# Patient Record
Sex: FEMALE | Race: BLACK OR AFRICAN AMERICAN | NOT HISPANIC OR LATINO | URBAN - METROPOLITAN AREA
[De-identification: names, ages, dates, MRNs, and addresses within clinical notes are randomized per-mention and may not be internally consistent; named-entity substitution may affect disease eponyms.]

---

## 2023-10-04 ENCOUNTER — HOSPITAL ENCOUNTER (EMERGENCY)
Facility: HOSPITAL | Age: 48
Discharge: LEFT BEFORE DISCHARGE | End: 2023-10-04
Payer: COMMERCIAL

## 2023-10-04 LAB
ALBUMIN SERPL BCP-MCNC: 4.4 G/DL (ref 3.4–5)
ALP SERPL-CCNC: 45 U/L (ref 33–110)
ALT SERPL W P-5'-P-CCNC: 18 U/L (ref 7–45)
ANION GAP SERPL CALC-SCNC: 10 MMOL/L (ref 10–20)
AST SERPL W P-5'-P-CCNC: 20 U/L (ref 9–39)
B-HCG SERPL-ACNC: <2 MIU/ML
BASOPHILS # BLD AUTO: 0.05 X10*3/UL (ref 0–0.1)
BASOPHILS NFR BLD AUTO: 0.7 %
BILIRUB SERPL-MCNC: 0.4 MG/DL (ref 0–1.2)
BUN SERPL-MCNC: 13 MG/DL (ref 6–23)
CALCIUM SERPL-MCNC: 9.6 MG/DL (ref 8.6–10.3)
CHLORIDE SERPL-SCNC: 101 MMOL/L (ref 98–107)
CO2 SERPL-SCNC: 28 MMOL/L (ref 21–32)
CREAT SERPL-MCNC: 1.07 MG/DL (ref 0.5–1.05)
EOSINOPHIL # BLD AUTO: 0.14 X10*3/UL (ref 0–0.7)
EOSINOPHIL NFR BLD AUTO: 2 %
ERYTHROCYTE [DISTWIDTH] IN BLOOD BY AUTOMATED COUNT: 13.2 % (ref 11.5–14.5)
GFR SERPL CREATININE-BSD FRML MDRD: 63 ML/MIN/1.73M*2
GLUCOSE SERPL-MCNC: 79 MG/DL (ref 74–99)
HCT VFR BLD AUTO: 36.8 % (ref 36–46)
HGB BLD-MCNC: 11.9 G/DL (ref 12–16)
IMM GRANULOCYTES # BLD AUTO: 0.02 X10*3/UL (ref 0–0.7)
IMM GRANULOCYTES NFR BLD AUTO: 0.3 % (ref 0–0.9)
LYMPHOCYTES # BLD AUTO: 1.58 X10*3/UL (ref 1.2–4.8)
LYMPHOCYTES NFR BLD AUTO: 22.3 %
MCH RBC QN AUTO: 26.7 PG (ref 26–34)
MCHC RBC AUTO-ENTMCNC: 32.3 G/DL (ref 32–36)
MCV RBC AUTO: 83 FL (ref 80–100)
MONOCYTES # BLD AUTO: 0.34 X10*3/UL (ref 0.1–1)
MONOCYTES NFR BLD AUTO: 4.8 %
NEUTROPHILS # BLD AUTO: 4.97 X10*3/UL (ref 1.2–7.7)
NEUTROPHILS NFR BLD AUTO: 69.9 %
NRBC BLD-RTO: 0 /100 WBCS (ref 0–0)
PLATELET # BLD AUTO: 270 X10*3/UL (ref 150–450)
PMV BLD AUTO: 10 FL (ref 7.5–11.5)
POTASSIUM SERPL-SCNC: 4.3 MMOL/L (ref 3.5–5.3)
PROT SERPL-MCNC: 7.2 G/DL (ref 6.4–8.2)
RBC # BLD AUTO: 4.46 X10*6/UL (ref 4–5.2)
SODIUM SERPL-SCNC: 135 MMOL/L (ref 136–145)
WBC # BLD AUTO: 7.1 X10*3/UL (ref 4.4–11.3)

## 2023-10-04 PROCEDURE — 80053 COMPREHEN METABOLIC PANEL: CPT | Performed by: PHYSICIAN ASSISTANT

## 2023-10-04 PROCEDURE — 84702 CHORIONIC GONADOTROPIN TEST: CPT | Performed by: PHYSICIAN ASSISTANT

## 2023-10-04 PROCEDURE — 36415 COLL VENOUS BLD VENIPUNCTURE: CPT | Performed by: PHYSICIAN ASSISTANT

## 2023-10-04 PROCEDURE — 85025 COMPLETE CBC W/AUTO DIFF WBC: CPT | Performed by: PHYSICIAN ASSISTANT

## 2023-10-04 PROCEDURE — 99284 EMERGENCY DEPT VISIT MOD MDM: CPT

## 2023-10-04 PROCEDURE — 99283 EMERGENCY DEPT VISIT LOW MDM: CPT

## 2023-10-04 ASSESSMENT — COLUMBIA-SUICIDE SEVERITY RATING SCALE - C-SSRS
2. HAVE YOU ACTUALLY HAD ANY THOUGHTS OF KILLING YOURSELF?: NO
1. IN THE PAST MONTH, HAVE YOU WISHED YOU WERE DEAD OR WISHED YOU COULD GO TO SLEEP AND NOT WAKE UP?: NO
6. HAVE YOU EVER DONE ANYTHING, STARTED TO DO ANYTHING, OR PREPARED TO DO ANYTHING TO END YOUR LIFE?: NO

## 2023-10-04 NOTE — ED NOTES
Pt does not want to wait any longer. IV removed and Pt departed.     Toño Pfeiffer RN  10/04/23 [id number]

## 2023-10-04 NOTE — ED TRIAGE NOTES
History of Present Illness: Irregular heavy vaginal bleeding without pain or cramping.  Feels fatigued tired no dizziness or syncope.  No fever no vomiting.  Not sexually active for 2 months.  No discharge.  No prior history of anemia.  No prior need of blood transfusion.    Brief Physical: Vitals reviewed.  Exam is limited due to the patient being examined in a chair in triage.  No acute distress.  Abdomen soft nontender.    Working diagnosis: Dysfunctional uterine bleeding, anemia    Plan: Workup initiated. Patient to be moved to the main ER or FT, pending bed availability, for further evaluation, diagnosis, treatment, and disposition.    For the remainder of the patient's workup and ED course, please see the main ED provider note.    This patient was seen in triage area to expedite diagnostic studies and patient care pending bed availability in the ED/fast-track area. However I am not participating in care after this initial evaluation. This is a preliminary assessment. Pt does not appear in acute distress at this time. They are stable and will have a full evaluation as soon as possible. They will be cared for by another provider who will possibly order more labs, imaging or interventions. Pt did not have a full ROS or PE completed by myself.     Disclaimer: This note was dictated by speech recognition. An attempt at proof reading was made to minimize errors. Errors in transcription may be present.  Please call if questions.

## 2023-12-13 LAB — HOLD SPECIMEN: NORMAL

## 2024-08-07 ENCOUNTER — HOSPITAL ENCOUNTER (OUTPATIENT)
Dept: RADIOLOGY | Facility: CLINIC | Age: 49
Discharge: HOME | End: 2024-08-07
Payer: COMMERCIAL

## 2024-08-07 ENCOUNTER — LAB (OUTPATIENT)
Dept: LAB | Facility: LAB | Age: 49
End: 2024-08-07
Payer: COMMERCIAL

## 2024-08-07 ENCOUNTER — OFFICE VISIT (OUTPATIENT)
Dept: RHEUMATOLOGY | Facility: CLINIC | Age: 49
End: 2024-08-07
Payer: COMMERCIAL

## 2024-08-07 VITALS
DIASTOLIC BLOOD PRESSURE: 85 MMHG | WEIGHT: 240.6 LBS | BODY MASS INDEX: 36.46 KG/M2 | HEART RATE: 83 BPM | SYSTOLIC BLOOD PRESSURE: 125 MMHG | HEIGHT: 68 IN | TEMPERATURE: 97.7 F | OXYGEN SATURATION: 97 %

## 2024-08-07 DIAGNOSIS — M25.50 ARTHRALGIA, UNSPECIFIED JOINT: Primary | ICD-10-CM

## 2024-08-07 DIAGNOSIS — R76.8 POSITIVE ANA (ANTINUCLEAR ANTIBODY): ICD-10-CM

## 2024-08-07 DIAGNOSIS — M25.50 ARTHRALGIA, UNSPECIFIED JOINT: ICD-10-CM

## 2024-08-07 LAB
ALBUMIN SERPL BCP-MCNC: 4.2 G/DL (ref 3.4–5)
ALP SERPL-CCNC: 44 U/L (ref 33–110)
ALT SERPL W P-5'-P-CCNC: 14 U/L (ref 7–45)
ANION GAP SERPL CALC-SCNC: 12 MMOL/L (ref 10–20)
APPEARANCE UR: CLEAR
AST SERPL W P-5'-P-CCNC: 14 U/L (ref 9–39)
BACTERIA #/AREA URNS AUTO: ABNORMAL /HPF
BASOPHILS # BLD AUTO: 0.04 X10*3/UL (ref 0–0.1)
BASOPHILS NFR BLD AUTO: 0.5 %
BILIRUB SERPL-MCNC: 0.3 MG/DL (ref 0–1.2)
BILIRUB UR STRIP.AUTO-MCNC: NEGATIVE MG/DL
BUN SERPL-MCNC: 16 MG/DL (ref 6–23)
C3 SERPL-MCNC: 138 MG/DL (ref 87–200)
C4 SERPL-MCNC: 47 MG/DL (ref 10–50)
CALCIUM SERPL-MCNC: 9.7 MG/DL (ref 8.6–10.6)
CCP IGG SERPL-ACNC: 12 U/ML
CENTROMERE B AB SER-ACNC: <0.2 AI
CHLORIDE SERPL-SCNC: 100 MMOL/L (ref 98–107)
CHROMATIN AB SERPL-ACNC: <0.2 AI
CO2 SERPL-SCNC: 26 MMOL/L (ref 21–32)
COLOR UR: YELLOW
CREAT SERPL-MCNC: 0.82 MG/DL (ref 0.5–1.05)
CREAT UR-MCNC: 185.4 MG/DL (ref 20–320)
DSDNA AB SER-ACNC: 1 IU/ML
EGFRCR SERPLBLD CKD-EPI 2021: 87 ML/MIN/1.73M*2
ENA JO1 AB SER QL IA: <0.2 AI
ENA RNP AB SER IA-ACNC: 0.3 AI
ENA SCL70 AB SER QL IA: <0.2 AI
ENA SM AB SER IA-ACNC: <0.2 AI
ENA SM+RNP AB SER QL IA: <0.2 AI
ENA SS-A AB SER IA-ACNC: 0.4 AI
ENA SS-B AB SER IA-ACNC: <0.2 AI
EOSINOPHIL # BLD AUTO: 0.08 X10*3/UL (ref 0–0.7)
EOSINOPHIL NFR BLD AUTO: 1 %
ERYTHROCYTE [DISTWIDTH] IN BLOOD BY AUTOMATED COUNT: 12.7 % (ref 11.5–14.5)
GLUCOSE SERPL-MCNC: 89 MG/DL (ref 74–99)
GLUCOSE UR STRIP.AUTO-MCNC: NORMAL MG/DL
HCT VFR BLD AUTO: 38.2 % (ref 36–46)
HGB BLD-MCNC: 12.7 G/DL (ref 12–16)
IMM GRANULOCYTES # BLD AUTO: 0.02 X10*3/UL (ref 0–0.7)
IMM GRANULOCYTES NFR BLD AUTO: 0.3 % (ref 0–0.9)
KETONES UR STRIP.AUTO-MCNC: NEGATIVE MG/DL
LEUKOCYTE ESTERASE UR QL STRIP.AUTO: ABNORMAL
LYMPHOCYTES # BLD AUTO: 1.7 X10*3/UL (ref 1.2–4.8)
LYMPHOCYTES NFR BLD AUTO: 22 %
MCH RBC QN AUTO: 27.7 PG (ref 26–34)
MCHC RBC AUTO-ENTMCNC: 33.2 G/DL (ref 32–36)
MCV RBC AUTO: 83 FL (ref 80–100)
MONOCYTES # BLD AUTO: 0.34 X10*3/UL (ref 0.1–1)
MONOCYTES NFR BLD AUTO: 4.4 %
MUCOUS THREADS #/AREA URNS AUTO: ABNORMAL /LPF
NEUTROPHILS # BLD AUTO: 5.54 X10*3/UL (ref 1.2–7.7)
NEUTROPHILS NFR BLD AUTO: 71.8 %
NITRITE UR QL STRIP.AUTO: NEGATIVE
NRBC BLD-RTO: 0 /100 WBCS (ref 0–0)
PH UR STRIP.AUTO: 6 [PH]
PLATELET # BLD AUTO: 315 X10*3/UL (ref 150–450)
POTASSIUM SERPL-SCNC: 3.9 MMOL/L (ref 3.5–5.3)
PROT SERPL-MCNC: 7.2 G/DL (ref 6.4–8.2)
PROT UR STRIP.AUTO-MCNC: ABNORMAL MG/DL
PROT UR-ACNC: 10 MG/DL (ref 5–24)
PROT/CREAT UR: 0.05 MG/MG CREAT (ref 0–0.17)
RBC # BLD AUTO: 4.59 X10*6/UL (ref 4–5.2)
RBC # UR STRIP.AUTO: ABNORMAL /UL
RBC #/AREA URNS AUTO: ABNORMAL /HPF
RHEUMATOID FACT SER NEPH-ACNC: 181 IU/ML (ref 0–15)
RIBOSOMAL P AB SER-ACNC: <0.2 AI
SODIUM SERPL-SCNC: 134 MMOL/L (ref 136–145)
SP GR UR STRIP.AUTO: 1.02
SQUAMOUS #/AREA URNS AUTO: ABNORMAL /HPF
UROBILINOGEN UR STRIP.AUTO-MCNC: NORMAL MG/DL
WBC # BLD AUTO: 7.7 X10*3/UL (ref 4.4–11.3)
WBC #/AREA URNS AUTO: ABNORMAL /HPF

## 2024-08-07 PROCEDURE — 36415 COLL VENOUS BLD VENIPUNCTURE: CPT

## 2024-08-07 PROCEDURE — 81001 URINALYSIS AUTO W/SCOPE: CPT

## 2024-08-07 PROCEDURE — 73620 X-RAY EXAM OF FOOT: CPT | Mod: LT

## 2024-08-07 PROCEDURE — 86235 NUCLEAR ANTIGEN ANTIBODY: CPT

## 2024-08-07 PROCEDURE — 3008F BODY MASS INDEX DOCD: CPT | Performed by: STUDENT IN AN ORGANIZED HEALTH CARE EDUCATION/TRAINING PROGRAM

## 2024-08-07 PROCEDURE — 73620 X-RAY EXAM OF FOOT: CPT | Mod: RT

## 2024-08-07 PROCEDURE — 86200 CCP ANTIBODY: CPT

## 2024-08-07 PROCEDURE — 86160 COMPLEMENT ANTIGEN: CPT

## 2024-08-07 PROCEDURE — 80053 COMPREHEN METABOLIC PANEL: CPT

## 2024-08-07 PROCEDURE — 86038 ANTINUCLEAR ANTIBODIES: CPT

## 2024-08-07 PROCEDURE — 86431 RHEUMATOID FACTOR QUANT: CPT

## 2024-08-07 PROCEDURE — 84156 ASSAY OF PROTEIN URINE: CPT

## 2024-08-07 PROCEDURE — 99204 OFFICE O/P NEW MOD 45 MIN: CPT | Performed by: STUDENT IN AN ORGANIZED HEALTH CARE EDUCATION/TRAINING PROGRAM

## 2024-08-07 PROCEDURE — 85025 COMPLETE CBC W/AUTO DIFF WBC: CPT

## 2024-08-07 PROCEDURE — 86225 DNA ANTIBODY NATIVE: CPT

## 2024-08-07 PROCEDURE — 82570 ASSAY OF URINE CREATININE: CPT

## 2024-08-07 RX ORDER — AMLODIPINE BESYLATE 5 MG/1
5 TABLET ORAL DAILY
COMMUNITY

## 2024-08-07 RX ORDER — DULOXETIN HYDROCHLORIDE 60 MG/1
60 CAPSULE, DELAYED RELEASE ORAL 2 TIMES DAILY
COMMUNITY
Start: 2024-07-30 | End: 2024-08-29

## 2024-08-07 NOTE — PROGRESS NOTES
"Rheumatology Clinic  [facility]  New Patient Clinic Visit    49 y.o.        female    referred by Dr. Robb ref. provider found    CHIEF COMPLAINT: positive NILAM    Patient ID: [id number]   Irena Rivas is a 49 y.o. female who presents for Establish Care      HPI:   - noted to have positive NILAM 1:80 from 07/2024 though labs at the [facility] and a CRP 7.0. The reason she underwent these labs is because last month she underwent routine screening labs from \"[facility] screening labs\" showing CRP 4.7.   - she has joint pain involving the feet, small joint of the hands mostly in the PIP joints,and symptoms of frozen shoulder of bilateral shoulder- symptoms began several months ago and worse in the cold, worse with walking, moving up/down stairs  - morning stiffness of 5-10 mins  - shoulder pain-10/10, feet pain 8/10, hand joints 7/10 severity during bad days   - no FH of autoimmmune diseases    Current rheum meds:  -N/A    Previous rheum meds:  -N/A    There is no problem list on file for this patient.        Past Medical History:   Diagnosis Date    Other specified health status     No pertinent past medical history            No past surgical history on file.    No Known Allergies    Medication Documentation Review Audit       Reviewed by Yossi Henao MA (Medical Assistant) on 08/07/24 at 1507      Medication Order Taking? Sig Documenting Provider Last Dose Status   amLODIPine (Norvasc) 5 mg tablet [id number]  Take 1 tablet (5 mg) by mouth once daily. Historical Provider, MD  Active   DULoxetine (Cymbalta) 60 mg DR capsule [id number] Yes Take 1 capsule (60 mg) by mouth twice a day. Historical Provider, MD  Active                        No family history on file.              Review of Systems    REVIEW OF SYSTEMS:  Constitutional: No fatigue  Skin:  No rash or psoriasis or photosensitvity  Head: No headache, oral ulcers or hair loss  Neck: No difficulty swallowing or choking  Eyes: mild eye " dryness and oral dryness  Mouth: No dry mouth  or oral ulcers  Pulmonary: No wheezing, pleurisy or SOB  Cardiovascular: No chest pain or palpitations  Gastrointestinal: No abdominal pain, nausea, heartburn, or blood in stool  Endocrine: No Raynaud's   Musculoskeletal: As H/P    All 10 review of systems negative      PHYSICAL EXAM:  Visit Vitals  /85 (BP Location: Right arm, Patient Position: Sitting, BP Cuff Size: Large adult)   Pulse 83   Temp 36.5 °C (97.7 °F) (Temporal)     General - NAD, sitting up in chair, well-groomed, pleasant, AAOx3  Head: Normocephalic, atraumatic  Eyes - PERRLA, EOMI. No conjunctiva injection.   Mouth/ENT - Moist oral and nasal mucosa. No facial rash. No enlarged parotid or submandibular gland. Adequate salivary pooling.  Extremities - No edema, cyanosis ,or clubbing  Neurological - Alert and oriented x 3,  grossly intact. No focal deficit.  Musculoskeletal -  EXAMJOINTDETAILED,  Shoulders: Full ROM, without pain, no swelling, warmth or tenderness.  Elbows: Full ROM, without pain, no swelling, warmth or tenderness.  Wrists: Full ROM, without pain, no swelling, warmth or tenderness.  MCP: No swelling, warmth or tenderness. Metacarpal squeeze negative  PIP: No swelling, warmth or tenderness.  DIP: No swelling, warmth or tenderness.  Hands : 5/5.    Sacroiliac joints: No local tenderness. RAYMUNDO negative.   Hips: Full ROM.  No malalignment.  Knees:  Full ROM, without pain, no swelling, warmth or point tenderness. No joint line tenderness, no pes anserine tenderness.  Ankles: Full ROM, without pain  warmth or tenderness. Trace BLE edema in the lower extremities  Cervical spine: No tenderness or limitation of movement  Lumbar spine: No tenderness or limitation of movement    Lab Results   Component Value Date    WBC 7.1 10/04/2023    HGB 11.9 (L) 10/04/2023    HCT 36.8 10/04/2023    MCV 83 10/04/2023     10/04/2023        Chemistry    Lab Results   Component Value Date/Time     " (L) 10/04/2023 1422    K 4.3 10/04/2023 1422     10/04/2023 1422    CO2 28 10/04/2023 1422    BUN 13 10/04/2023 1422    CREATININE 1.07 (H) 10/04/2023 1422    Lab Results   Component Value Date/Time    CALCIUM 9.6 10/04/2023 1422    ALKPHOS 45 10/04/2023 1422    AST 20 10/04/2023 1422    ALT 18 10/04/2023 1422    BILITOT 0.4 10/04/2023 1422           Lab Results   Component Value Date    CRP 0.12 01/27/2021      Lab Results   Component Value Date    NILAM NEGATIVE 03/26/2019     (H) 03/26/2019      No results found for: \"CKTOTAL\"  Lab Results   Component Value Date    NEUTROABS 4.97 10/04/2023      No results found for: \"FERRITIN\"   No results found for: \"HEPATOT\", \"HEPAIGM\", \"HEPBCIGM\", \"HEPBCAB\", \"HBEAG\", \"HEPCAB\"   Lab Results   Component Value Date    ALT 18 10/04/2023    AST 20 10/04/2023    ALKPHOS 45 10/04/2023    BILITOT 0.4 10/04/2023      No results found for: \"PPD\"   No results found for: \"URICACID\"   Lab Results   Component Value Date    CALCIUM 9.6 10/04/2023      No results found for: \"SPEP\", \"UPEP\"   No results found for: \"ALBUR\", \"YCL17RSJ\"     No echocardiogram results found for the past 12 months  No DXA results found for the past 12 months     Colonoscopy  Patient Name: Irena Rivas  Procedure Date: 3/30/2022 10:20 AM  MRN: [id number]  Account Number: [id number]  YOB: 1975  Admit Type: Outpatient  Site: [facility]  Ethnicity: Not  or   Race: Black or   Attending MD: Dariel Arceo MD, [id number]  Procedure:             Colonoscopy  Indications:           Screening for colorectal malignant neoplasm, This is                          the patient's first colonoscopy  Patient Profile:       Refer to note in patient chart for documentation of                          history and physical.  Providers:             Dariel Arceo MD (Doctor) Gastroenterology, Paola Gandhi RN                          (Nurse) , Katya Quan CST, Leticia NELSON " Eddie                          Technician  Referring:             Era Foreman DO  Medicines:             Monitored Anesthesia Care  Complications:         No immediate complications. Estimated blood loss:                          Minimal.  Procedure:             Pre-Anesthesia Assessment:                         - Prior to the procedure, a History and Physical was                          performed, and patient medications and allergies were                          reviewed. The patient is competent. The risks and                          benefits of the procedure and the sedation options and                          risks were discussed with the patient. All questions                          were answered and informed consent was obtained.                          Patient identification and proposed procedure were                          verified by the physician, the nurse and the                          anesthetist in the pre-procedure area. Mental Status                          Examination: alert and oriented. Airway Examination:                          Mallampati Class II (the uvula but not tonsillar                          pillars visualized). Respiratory Examination: clear to                          auscultation. CV Examination: normal. Prophylactic                          Antibiotics: The patient does not require prophylactic                          antibiotics. Prior Anticoagulants: The patient has                          taken no anticoagulant or antiplatelet agents. ASA                          Grade Assessment: II - A patient with mild systemic                          disease. After reviewing the risks and benefits, the                          patient was deemed in satisfactory condition to                          undergo the procedure. The anesthesia plan was to use                          monitored anesthesia care (MAC). Immediately prior to                          administration of  medications, the patient was                          re-assessed for adequacy to receive sedatives. The                          heart rate, respiratory rate, oxygen saturations,                          blood pressure, adequacy of pulmonary ventilation, and                          response to care were monitored throughout the                          procedure. The physical status of the patient was                          re-assessed after the procedure.                         After I obtained informed consent, the scope was                          passed under direct vision. Throughout the procedure,                          the patient's blood pressure, pulse, and oxygen                          saturations were monitored continuously. The pediatric                          colonoscope was introduced through the anus and                          advanced to the terminal ileum, with identification of                          the appendiceal orifice and IC valve. The colonoscopy                          was performed without difficulty. The patient                          tolerated the procedure well. The quality of the bowel                          preparation was evaluated using the BBPS (Alpha Bowel                          Preparation Scale) with scores of: Right Colon = 2                          (minor amount of residual staining, small fragments of                          stool and/or opaque liquid, but mucosa seen well),                          Transverse Colon = 3 (entire mucosa seen well with no                          residual staining, small fragments of stool or opaque                          liquid) and Left Colon = 3 (entire mucosa seen well                          with no residual staining, small fragments of stool or                          opaque liquid). The total BBPS score equals 8. The                          quality of the bowel preparation was good. The                           terminal ileum, ileocecal valve, appendiceal orifice,                          and rectum were photographed.  Findings:       The perianal and digital rectal examinations were normal.       A 3 mm polyp was found in the transverse colon. The polyp was sessile.        The polyp was removed with a cold snare. Resection and retrieval were        complete. The pathology specimen was placed into Bottle A. Estimated        blood loss was minimal.       Non-bleeding internal hemorrhoids were found during retroflexion. The        hemorrhoids were small and Grade I (internal hemorrhoids that do not        prolapse).       The exam was otherwise normal throughout the examined colon.       The terminal ileum appeared normal.  Estimated Blood Loss:       Estimated blood loss was minimal.  Impression:            - One 3 mm polyp in the transverse colon, removed with                          a cold snare. Resected and retrieved.                         - Non-bleeding internal hemorrhoids.  Recommendation:        - Patient has a contact number available for                          emergencies. The signs and symptoms of potential                          delayed complications were discussed with the patient.                          Return to normal activities tomorrow. Written                          discharge instructions were provided to the patient.                         - Resume previous diet.                         - Continue present medications.                         - Await pathology results.                         - Repeat colonoscopy in 10 years for screening                          purposes.                         - Return to primary care physician as previously                          scheduled.  Procedure Code(s):     --- Professional ---                         67750, Colonoscopy, flexible; with removal of                          tumor(s), polyp(s), or other lesion(s) by snare                           technique  Diagnosis Code(s):     --- Professional ---                         Z12.11, Encounter for screening for malignant neoplasm                          of colon                         D12.3, Benign neoplasm of transverse colon (hepatic                          flexure or splenic flexure)                         K64.0, First degree hemorrhoids  CPT copyright 2022 [employer]. All rights reserved.  The codes documented in this report are preliminary and upon  review may   be revised to meet current compliance requirements.  Attending Participation:       I personally performed the entire procedure.  Dariel Arceo MD  3/30/2022 10:59:47 AM  This report has been signed electronically.  Number of Addenda: 0  Note Initiated On: 3/30/2022 10:20 AM  Scope Withdrawal Time 0 hours 11 minutes 0 seconds   Total Procedure Duration Time 0 hours 18 minutes 55 seconds      === 01/06/20 ===    - Impression -  Unremarkable radiographs of the left shoulder      Assessment/plan:  49 y.o. female who presents for evaluation of positive NILAM seen on labs last month which showed NILAM 1:80 and mild elevation in the CRP of 7.0, and previously 4.7 through an external screening service done last month.  - she has joint pain involving the feet, small joint of the hands mostly in the PIP joints,and symptoms of frozen shoulder of bilateral shoulders. No other systemic signs or symptoms of SLE.    Plan  - she has a mild elevation in the NILAM titer 1:80 which can often be seen incidentally, and an elevated BMI cause a mild elevation in the CRP  - we will order an initial lab autoimmune lab evaluation and UA to assess for proteinuria  - we will also order bilateral feet xrays to assess for signs of erosive changes.      Irena was seen today for establish care.  Diagnoses and all orders for this visit:  Arthralgia, unspecified joint (Primary)  -     NILAM + LAKESHA Panel; Future  -     Rheumatoid Factor; Future  -     Citrulline  Antibody, IgG; Future  -     C3 Complement; Future  -     C4 Complement; Future  -     CBC and Auto Differential; Future  -     Comprehensive Metabolic Panel; Future  -     Urinalysis with Reflex Microscopic; Future  -     Protein, Urine Random; Future  -     XR foot left 3+ views; Future  -     XR foot right 3+ views; Future  Positive NILAM (antinuclear antibody)  -     NILAM + LAKESHA Panel; Future  -     Rheumatoid Factor; Future  -     Citrulline Antibody, IgG; Future  -     C3 Complement; Future  -     C4 Complement; Future  -     CBC and Auto Differential; Future  -     Comprehensive Metabolic Panel; Future  -     Urinalysis with Reflex Microscopic; Future  -     Protein, Urine Random; Future  -     XR foot left 3+ views; Future  -     XR foot right 3+ views; Future        MD Zac Luo M.D.  Rheumatology Fellow, PGY4  [facility]

## 2024-08-08 LAB
ANA PATTERN: ABNORMAL
ANA SER QL HEP2 SUBST: POSITIVE
ANA TITR SER IF: ABNORMAL {TITER}

## 2024-08-08 NOTE — PROGRESS NOTES
I saw and evaluated the patient. I personally obtained the key and critical portions of the history and physical exam or was physically present for key and critical portions performed by the resident/fellow. I reviewed the resident/fellow's documentation and discussed the patient with the resident/fellow. I agree with the resident/fellow's medical decision making as documented in the note.    Tatiana Pelayo MD

## 2024-08-12 ENCOUNTER — TELEPHONE (OUTPATIENT)
Dept: RHEUMATOLOGY | Facility: CLINIC | Age: 49
End: 2024-08-12
Payer: COMMERCIAL

## 2024-08-12 NOTE — TELEPHONE ENCOUNTER
I contacted Ms. Rivas by telephone and informed her that her left foot xrays were unremarkable,however her autoimmune testing from her recent clinic visit was suggestive of RA given elevated RF and CCP antibodies. She has a follow-up appt with the Rheumatology clinic scheduled for 8/26 to discuss treatment options. All questions were answered.    Zac Salomon MD

## 2024-08-26 ENCOUNTER — APPOINTMENT (OUTPATIENT)
Dept: RHEUMATOLOGY | Facility: CLINIC | Age: 49
End: 2024-08-26
Payer: COMMERCIAL

## 2024-08-26 VITALS
HEIGHT: 68 IN | DIASTOLIC BLOOD PRESSURE: 88 MMHG | WEIGHT: 240 LBS | BODY MASS INDEX: 36.37 KG/M2 | SYSTOLIC BLOOD PRESSURE: 122 MMHG | HEART RATE: 87 BPM

## 2024-08-26 DIAGNOSIS — M05.79 RHEUMATOID ARTHRITIS INVOLVING MULTIPLE SITES WITH POSITIVE RHEUMATOID FACTOR (MULTI): ICD-10-CM

## 2024-08-26 DIAGNOSIS — M19.049 HAND ARTHRITIS: Primary | ICD-10-CM

## 2024-08-26 PROCEDURE — 96372 THER/PROPH/DIAG INJ SC/IM: CPT | Performed by: INTERNAL MEDICINE

## 2024-08-26 PROCEDURE — 3008F BODY MASS INDEX DOCD: CPT | Performed by: INTERNAL MEDICINE

## 2024-08-26 PROCEDURE — 99215 OFFICE O/P EST HI 40 MIN: CPT | Performed by: INTERNAL MEDICINE

## 2024-08-26 PROCEDURE — 1036F TOBACCO NON-USER: CPT | Performed by: INTERNAL MEDICINE

## 2024-08-26 RX ORDER — METHYLPREDNISOLONE ACETATE 80 MG/ML
80 INJECTION, SUSPENSION INTRA-ARTICULAR; INTRALESIONAL; INTRAMUSCULAR; SOFT TISSUE ONCE
Status: COMPLETED | OUTPATIENT
Start: 2024-08-26 | End: 2024-08-26

## 2024-08-26 RX ORDER — FOLIC ACID 1 MG/1
1 TABLET ORAL DAILY
Qty: 30 TABLET | Refills: 2 | Status: SHIPPED | OUTPATIENT
Start: 2024-08-26 | End: 2025-08-26

## 2024-08-26 RX ORDER — METHOTREXATE 2.5 MG/1
12.5 TABLET ORAL
Qty: 20 TABLET | Refills: 2 | Status: SHIPPED | OUTPATIENT
Start: 2024-09-01

## 2024-08-26 NOTE — PROGRESS NOTES
49 y.o.        female    Chief complaint: Follow up of abnormal blood tests    HPI: 49 yr old with joint pains that started in 2019.  Has pain and swelling in her hands then. Saw Dr. Barreto and RF was 296 and anti-CCP antibody was negative and  treated with NSAIDS. Saw me in 8/2024. Her RF and anti-CCP body are positive in 2024.   She reports pain in both shoulders, both feet and hands . Has difficulty putting her bra on. She is taking 16 mg of Ibuprofen.   She has morning stiffness for 10 minutes.   Non-smoker. No family history of RA.     Past Medical History:   Diagnosis Date    Other specified health status     No pertinent past medical history            No Known Allergies    Medication Documentation Review Audit       Reviewed by Mariana Moreland MA (Medical Assistant) on 08/26/24 at 1127      Medication Order Taking? Sig Documenting Provider Last Dose Status   amLODIPine (Norvasc) 5 mg tablet [id number] Yes Take 1 tablet (5 mg) by mouth once daily. Historical Provider, MD Taking Active   DULoxetine (Cymbalta) 60 mg DR capsule [id number] No Take 1 capsule (60 mg) by mouth twice a day. Historical Provider, MD Not Taking Active                        No family history on file.       Social History     Tobacco Use    Smoking status: Never     Passive exposure: Never    Smokeless tobacco: Never         Review of Systems    REVIEW OF SYSTEMS:  Constitutional: No fatigue  Skin:  No rash or psoriasis or photosensitvity  Head: No headache, oral ulcers or hair loss  Neck: No difficulty swallowing or choking  Eyes: mild eye dryness and oral dryness  Mouth: No dry mouth  or oral ulcers  Pulmonary: No wheezing, pleurisy or SOB  Cardiovascular: No chest pain or palpitations  Gastrointestinal: No abdominal pain, nausea, heartburn, or blood in stool  Endocrine: No Raynaud's   Musculoskeletal: As H/P    All 10 review of systems negative      PHYSICAL EXAM:  Visit Vitals  /88 (BP Location: Right arm,  "Patient Position: Sitting, BP Cuff Size: Large adult)   Pulse 87     General - NAD, sitting up in chair, well-groomed, pleasant, AAOx3  Head: Normocephalic, atraumatic  Eyes - PERRLA, EOMI. No conjunctiva injection.   Mouth/ENT - Moist oral and nasal mucosa. No facial rash. No enlarged parotid or submandibular gland. Adequate salivary pooling.  Extremities - No edema, cyanosis ,or clubbing  Neurological - Alert and oriented x 3,  grossly intact. No focal deficit.  Musculoskeletal -  EXAMJOINTDETAILED,  Shoulders: Full ROM, without pain, no swelling, warmth or tenderness.  Elbows: Full ROM, without pain, no swelling, warmth or tenderness.  Wrists: Full ROM, without pain, no swelling, warmth or tenderness.  MCP: No swelling, warmth or tenderness. Metacarpal squeeze negative  PIP: No swelling, warmth or tenderness.  DIP: No swelling, warmth or tenderness.  Hands : 5/5.        Lab Results   Component Value Date    WBC 7.7 08/07/2024    HGB 12.7 08/07/2024    HCT 38.2 08/07/2024    MCV 83 08/07/2024     08/07/2024        Chemistry    Lab Results   Component Value Date/Time     (L) 08/07/2024 1613    K 3.9 08/07/2024 1613     08/07/2024 1613    CO2 26 08/07/2024 1613    BUN 16 08/07/2024 1613    CREATININE 0.82 08/07/2024 1613    Lab Results   Component Value Date/Time    CALCIUM 9.7 08/07/2024 1613    ALKPHOS 44 08/07/2024 1613    AST 14 08/07/2024 1613    ALT 14 08/07/2024 1613    BILITOT 0.3 08/07/2024 1613           Lab Results   Component Value Date    CRP 0.12 01/27/2021      Lab Results   Component Value Date    NILAM Positive (A) 08/07/2024     (H) 08/07/2024      No results found for: \"CKTOTAL\"  Lab Results   Component Value Date    NEUTROABS 5.54 08/07/2024      No results found for: \"FERRITIN\"   No results found for: \"HEPATOT\", \"HEPAIGM\", \"HEPBCIGM\", \"HEPBCAB\", \"HBEAG\", \"HEPCAB\"   Lab Results   Component Value Date    ALT 14 08/07/2024    AST 14 08/07/2024    ALKPHOS 44 08/07/2024    " "BILITOT 0.3 08/07/2024      No results found for: \"PPD\"   No results found for: \"URICACID\"   Lab Results   Component Value Date    CALCIUM 9.7 08/07/2024      No results found for: \"SPEP\", \"UPEP\"   No results found for: \"ALBUR\", \"YKT37ACE\"     No echocardiogram results found for the past 12 months  No DXA results found for the past 12 months     XR foot left 1-2 views, XR foot right 1-2 views  Narrative: Interpreted By:  Emir Robles,   STUDY:  XR FOOT LEFT 1-2 VIEWS; XR FOOT RIGHT 1-2 VIEWS      INDICATION:  Signs/Symptoms:bilateral foot pain.      COMPARISON:  None      ACCESSION NUMBER(S):  [id number]; [id number]      ORDERING CLINICIAN:  DOROTHY ROBERSON      FINDINGS:  Small right foot bunion. Left foot unremarkable. No other osseous,  articular, or soft tissue abnormality.      Impression: Normal radiographs left foot.      Small bunion right foot.      Signed by: Emir Robles 8/11/2024 2:39 PM  Dictation workstation:   [id number]     === 01/06/20 ===    - Impression -  Unremarkable radiographs of the left shoulder      Component      Latest Ref Rng 8/7/2024   WBC      4.4 - 11.3 x10*3/uL 7.7    nRBC      0.0 - 0.0 /100 WBCs 0.0    RBC      4.00 - 5.20 x10*6/uL 4.59    HEMOGLOBIN      12.0 - 16.0 g/dL 12.7    HEMATOCRIT      36.0 - 46.0 % 38.2    MCV      80 - 100 fL 83    MCH      26.0 - 34.0 pg 27.7    MCHC      32.0 - 36.0 g/dL 33.2    RED CELL DISTRIBUTION WIDTH      11.5 - 14.5 % 12.7    Platelets      150 - 450 x10*3/uL 315    Neutrophils %      40.0 - 80.0 % 71.8    Immature Granulocytes %, Automated      0.0 - 0.9 % 0.3    Lymphocytes %      13.0 - 44.0 % 22.0    Monocytes %      2.0 - 10.0 % 4.4    Eosinophils %      0.0 - 6.0 % 1.0    Basophils %      0.0 - 2.0 % 0.5    Neutrophils Absolute      1.20 - 7.70 x10*3/uL 5.54    Immature Granulocytes Absolute, Automated      0.00 - 0.70 x10*3/uL 0.02    Lymphocytes Absolute      1.20 - 4.80 x10*3/uL 1.70    Monocytes Absolute      0.10 - 1.00 x10*3/uL " 0.34    Eosinophils Absolute      0.00 - 0.70 x10*3/uL 0.08    Basophils Absolute      0.00 - 0.10 x10*3/uL 0.04    GLUCOSE      74 - 99 mg/dL 89    SODIUM      136 - 145 mmol/L 134 (L)    POTASSIUM      3.5 - 5.3 mmol/L 3.9    CHLORIDE      98 - 107 mmol/L 100    Bicarbonate      21 - 32 mmol/L 26    Anion Gap      10 - 20 mmol/L 12    Blood Urea Nitrogen      6 - 23 mg/dL 16    Creatinine      0.50 - 1.05 mg/dL 0.82    EGFR      >60 mL/min/1.73m*2 87    Calcium      8.6 - 10.6 mg/dL 9.7    Albumin      3.4 - 5.0 g/dL 4.2    Alkaline Phosphatase      33 - 110 U/L 44    Total Protein      6.4 - 8.2 g/dL 7.2    AST      9 - 39 U/L 14    Bilirubin Total      0.0 - 1.2 mg/dL 0.3    ALT      7 - 45 U/L 14    Anti-SM      <1.0 AI <0.2    Anti-RNP      <1.0 AI 0.3    Anti-SM/RNP      <1.0 AI <0.2    Anti-SSA      <1.0 AI 0.4    Anti-SSB      <1.0 AI <0.2    Anti-SCL-70      <1.0 AI <0.2    Anti-LISSETH-1 IgG      <1.0 AI <0.2    Anti-Chromatin      <1.0 AI <0.2    Anti-Centromere      <1.0 AI <0.2    ANTI-RIBOSOMAL P      <1.0 AI <0.2    Anti-DNA (DS)      <5.0 IU/mL 1.0    NILAM      Negative  Positive !    NILAM Pattern Homogeneous    NILAM Titer 1:160    Rheumatoid Factor      0 - 15 IU/mL 181 (H)    Citrulline Antibody, IgG      <3 U/mL 12 (H)    C3 Complement      87 - 200 mg/dL 138    C4 Complement      10 - 50 mg/dL 47         Narrative & Impression   Interpreted By:  Emir Robles,   STUDY:  XR FOOT LEFT 1-2 VIEWS; XR FOOT RIGHT 1-2 VIEWS      INDICATION:  Signs/Symptoms:bilateral foot pain.      COMPARISON:  None      ACCESSION NUMBER(S):  [id number]; [id number]      ORDERING CLINICIAN:  DOROTHY ROBERSON      FINDINGS:  Small right foot bunion. Left foot unremarkable. No other osseous,  articular, or soft tissue abnormality.      IMPRESSION:  Normal radiographs left foot.      Small bunion right foot.       Assessment/plan: 49 yr old BF with RA with RF and anti-CCP antibody.   She took Plaquenil in 2019 and took it for a  year with not much relief.   Discussed about treatment options. Will start on Methotrexate 5 pills/week. Side effects discussed. Folic acid 1 mg/day.   Depomedrol 80 mg IM.   Follow up in 8-10 weeks.     Reviewed and approved by DOROTHY ROBERSON on 8/26/24 at 11:55 AM.

## 2024-10-29 ENCOUNTER — APPOINTMENT (OUTPATIENT)
Dept: RHEUMATOLOGY | Facility: CLINIC | Age: 49
End: 2024-10-29
Payer: COMMERCIAL

## 2024-10-29 VITALS
BODY MASS INDEX: 35.58 KG/M2 | WEIGHT: 234 LBS | DIASTOLIC BLOOD PRESSURE: 74 MMHG | HEART RATE: 78 BPM | SYSTOLIC BLOOD PRESSURE: 134 MMHG

## 2024-10-29 DIAGNOSIS — Z79.899 LONG-TERM USE OF HIGH-RISK MEDICATION: ICD-10-CM

## 2024-10-29 DIAGNOSIS — M05.9 RHEUMATOID ARTHRITIS WITH POSITIVE RHEUMATOID FACTOR, INVOLVING UNSPECIFIED SITE (MULTI): Primary | ICD-10-CM

## 2024-10-29 DIAGNOSIS — M05.79 RHEUMATOID ARTHRITIS INVOLVING MULTIPLE SITES WITH POSITIVE RHEUMATOID FACTOR (MULTI): ICD-10-CM

## 2024-10-29 PROBLEM — J45.20 MILD INTERMITTENT ASTHMA WITHOUT COMPLICATION (HHS-HCC): Status: ACTIVE | Noted: 2024-10-29

## 2024-10-29 PROCEDURE — 99214 OFFICE O/P EST MOD 30 MIN: CPT | Performed by: INTERNAL MEDICINE

## 2024-10-29 PROCEDURE — 1036F TOBACCO NON-USER: CPT | Performed by: INTERNAL MEDICINE

## 2024-10-29 RX ORDER — METHOTREXATE 2.5 MG/1
12.5 TABLET ORAL
Qty: 20 TABLET | Refills: 2 | Status: SHIPPED | OUTPATIENT
Start: 2024-10-29

## 2024-10-29 ASSESSMENT — CLINICAL DISEASE ACTIVITY INDEX (CDAI)
PATIENT_ASSESSMENT: 1
PHYSICIAN_ASSESSMENT: 1

## 2024-12-21 ENCOUNTER — HOSPITAL ENCOUNTER (OUTPATIENT)
Dept: RADIOLOGY | Facility: CLINIC | Age: 49
Discharge: HOME | End: 2024-12-21
Payer: COMMERCIAL

## 2024-12-21 DIAGNOSIS — Z82.49 FAMILY HISTORY OF ISCHEMIC HEART DISEASE AND OTHER DISEASES OF THE CIRCULATORY SYSTEM: ICD-10-CM

## 2024-12-21 DIAGNOSIS — Z13.6 ENCOUNTER FOR SCREENING FOR CARDIOVASCULAR DISORDERS: ICD-10-CM

## 2024-12-21 PROCEDURE — 75571 CT HRT W/O DYE W/CA TEST: CPT

## 2025-02-21 ENCOUNTER — TELEPHONE (OUTPATIENT)
Dept: RHEUMATOLOGY | Facility: CLINIC | Age: 50
End: 2025-02-21
Payer: COMMERCIAL

## 2025-02-21 LAB
ANION GAP SERPL CALCULATED.4IONS-SCNC: 11 MMOL/L (CALC) (ref 7–17)
BUN SERPL-MCNC: 16 MG/DL (ref 7–25)
BUN/CREAT SERPL: 14 (CALC) (ref 6–22)
CALCIUM SERPL-MCNC: 10 MG/DL (ref 8.6–10.4)
CHLORIDE SERPL-SCNC: 101 MMOL/L (ref 98–110)
CO2 SERPL-SCNC: 26 MMOL/L (ref 20–32)
CREAT SERPL-MCNC: 1.16 MG/DL (ref 0.5–1.03)
CRP SERPL-MCNC: 31.3 MG/L
EGFRCR SERPLBLD CKD-EPI 2021: 57 ML/MIN/1.73M2
ERYTHROCYTE [DISTWIDTH] IN BLOOD BY AUTOMATED COUNT: 12.8 % (ref 11–15)
GLUCOSE SERPL-MCNC: 84 MG/DL (ref 65–99)
HCT VFR BLD AUTO: 40.6 % (ref 35–45)
HGB BLD-MCNC: 13.2 G/DL (ref 11.7–15.5)
MCH RBC QN AUTO: 29.1 PG (ref 27–33)
MCHC RBC AUTO-ENTMCNC: 32.5 G/DL (ref 32–36)
MCV RBC AUTO: 89.6 FL (ref 80–100)
PLATELET # BLD AUTO: 310 THOUSAND/UL (ref 140–400)
PMV BLD REES-ECKER: 10.2 FL (ref 7.5–12.5)
POTASSIUM SERPL-SCNC: 3.9 MMOL/L (ref 3.5–5.3)
RBC # BLD AUTO: 4.53 MILLION/UL (ref 3.8–5.1)
SODIUM SERPL-SCNC: 138 MMOL/L (ref 135–146)
WBC # BLD AUTO: 9.7 THOUSAND/UL (ref 3.8–10.8)

## 2025-02-21 NOTE — TELEPHONE ENCOUNTER
Placed call to [facility] and spoke with Loli. Loli made aware that Dr. Pelayo wants a hepatic function panel added to patient's labs that were drawn on 2/20/2025. Loli added test code 14585 - hepatic function panel to run.

## 2025-02-24 LAB
ALBUMIN SERPL-MCNC: 4.5 G/DL (ref 3.6–5.1)
ALBUMIN/GLOB SERPL: 1.5 (CALC) (ref 1–2.5)
ALP SERPL-CCNC: 49 U/L (ref 37–153)
ALT SERPL-CCNC: 12 U/L (ref 6–29)
ANION GAP SERPL CALCULATED.4IONS-SCNC: 11 MMOL/L (CALC) (ref 7–17)
AST SERPL-CCNC: 13 U/L (ref 10–35)
BILIRUB DIRECT SERPL-MCNC: 0.1 MG/DL
BILIRUB INDIRECT SERPL-MCNC: 0.3 MG/DL (CALC) (ref 0.2–1.2)
BILIRUB SERPL-MCNC: 0.4 MG/DL (ref 0.2–1.2)
BUN SERPL-MCNC: 16 MG/DL (ref 7–25)
BUN/CREAT SERPL: 14 (CALC) (ref 6–22)
CALCIUM SERPL-MCNC: 10 MG/DL (ref 8.6–10.4)
CHLORIDE SERPL-SCNC: 101 MMOL/L (ref 98–110)
CO2 SERPL-SCNC: 26 MMOL/L (ref 20–32)
CREAT SERPL-MCNC: 1.16 MG/DL (ref 0.5–1.03)
CRP SERPL-MCNC: 31.3 MG/L
EGFRCR SERPLBLD CKD-EPI 2021: 57 ML/MIN/1.73M2
ERYTHROCYTE [DISTWIDTH] IN BLOOD BY AUTOMATED COUNT: 12.8 % (ref 11–15)
GLOBULIN SER CALC-MCNC: 3.1 G/DL (CALC) (ref 1.9–3.7)
GLUCOSE SERPL-MCNC: 84 MG/DL (ref 65–99)
HCT VFR BLD AUTO: 40.6 % (ref 35–45)
HGB BLD-MCNC: 13.2 G/DL (ref 11.7–15.5)
Lab: NORMAL
MCH RBC QN AUTO: 29.1 PG (ref 27–33)
MCHC RBC AUTO-ENTMCNC: 32.5 G/DL (ref 32–36)
MCV RBC AUTO: 89.6 FL (ref 80–100)
PLATELET # BLD AUTO: 310 THOUSAND/UL (ref 140–400)
PMV BLD REES-ECKER: 10.2 FL (ref 7.5–12.5)
POTASSIUM SERPL-SCNC: 3.9 MMOL/L (ref 3.5–5.3)
PROT SERPL-MCNC: 7.6 G/DL (ref 6.1–8.1)
RBC # BLD AUTO: 4.53 MILLION/UL (ref 3.8–5.1)
SODIUM SERPL-SCNC: 138 MMOL/L (ref 135–146)
WBC # BLD AUTO: 9.7 THOUSAND/UL (ref 3.8–10.8)

## 2025-03-19 ENCOUNTER — ANCILLARY PROCEDURE (OUTPATIENT)
Dept: ORTHOPEDIC SURGERY | Facility: CLINIC | Age: 50
End: 2025-03-19
Payer: COMMERCIAL

## 2025-03-19 ENCOUNTER — APPOINTMENT (OUTPATIENT)
Dept: ORTHOPEDIC SURGERY | Facility: CLINIC | Age: 50
End: 2025-03-19
Payer: COMMERCIAL

## 2025-03-19 DIAGNOSIS — M22.41 PATELLOFEMORAL CHONDROSIS, RIGHT: Primary | ICD-10-CM

## 2025-03-19 DIAGNOSIS — M25.561 RIGHT KNEE PAIN, UNSPECIFIED CHRONICITY: ICD-10-CM

## 2025-03-19 PROCEDURE — 99204 OFFICE O/P NEW MOD 45 MIN: CPT | Performed by: STUDENT IN AN ORGANIZED HEALTH CARE EDUCATION/TRAINING PROGRAM

## 2025-03-19 PROCEDURE — 73564 X-RAY EXAM KNEE 4 OR MORE: CPT | Mod: RIGHT SIDE | Performed by: STUDENT IN AN ORGANIZED HEALTH CARE EDUCATION/TRAINING PROGRAM

## 2025-03-19 PROCEDURE — 20610 DRAIN/INJ JOINT/BURSA W/O US: CPT | Performed by: STUDENT IN AN ORGANIZED HEALTH CARE EDUCATION/TRAINING PROGRAM

## 2025-03-19 RX ADMIN — ROPIVACAINE HYDROCHLORIDE 4 ML: 5 INJECTION, SOLUTION EPIDURAL; INFILTRATION; PERINEURAL at 14:50

## 2025-03-19 RX ADMIN — LIDOCAINE HYDROCHLORIDE 4 ML: 10 INJECTION, SOLUTION EPIDURAL; INFILTRATION; INTRACAUDAL; PERINEURAL at 14:50

## 2025-03-19 RX ADMIN — TRIAMCINOLONE ACETONIDE 80 MG: 40 INJECTION, SUSPENSION INTRA-ARTICULAR; INTRAMUSCULAR at 14:50

## 2025-03-20 ENCOUNTER — APPOINTMENT (OUTPATIENT)
Dept: OTOLARYNGOLOGY | Facility: CLINIC | Age: 50
End: 2025-03-20
Payer: COMMERCIAL

## 2025-03-20 VITALS — WEIGHT: 222 LBS | BODY MASS INDEX: 33.75 KG/M2

## 2025-03-20 DIAGNOSIS — J31.0 CHRONIC RHINITIS: Primary | ICD-10-CM

## 2025-03-20 DIAGNOSIS — K21.9 GASTROESOPHAGEAL REFLUX DISEASE, UNSPECIFIED WHETHER ESOPHAGITIS PRESENT: ICD-10-CM

## 2025-03-20 DIAGNOSIS — J34.2 DEVIATED SEPTUM: ICD-10-CM

## 2025-03-20 DIAGNOSIS — H69.93 DYSFUNCTION OF BOTH EUSTACHIAN TUBES: ICD-10-CM

## 2025-03-20 DIAGNOSIS — K21.9 CHRONIC GERD: ICD-10-CM

## 2025-03-20 DIAGNOSIS — J32.9 CHRONIC SINUSITIS, UNSPECIFIED LOCATION: ICD-10-CM

## 2025-03-20 PROCEDURE — 99204 OFFICE O/P NEW MOD 45 MIN: CPT | Performed by: GENERAL PRACTICE

## 2025-03-20 PROCEDURE — 31575 DIAGNOSTIC LARYNGOSCOPY: CPT | Performed by: GENERAL PRACTICE

## 2025-03-20 PROCEDURE — G8433 SCR FOR DEP NOT CPT DOC RSN: HCPCS | Performed by: GENERAL PRACTICE

## 2025-03-20 RX ORDER — OMEPRAZOLE 20 MG/1
20 CAPSULE, DELAYED RELEASE ORAL
Qty: 30 CAPSULE | Refills: 3 | Status: SHIPPED | OUTPATIENT
Start: 2025-03-20 | End: 2026-03-20

## 2025-03-20 RX ORDER — TRIAMCINOLONE ACETONIDE 55 UG/1
2 SPRAY, METERED NASAL DAILY
Qty: 16.5 G | Refills: 3 | Status: SHIPPED | OUTPATIENT
Start: 2025-03-20 | End: 2026-03-20

## 2025-03-20 RX ORDER — FAMOTIDINE 20 MG/1
20 TABLET, FILM COATED ORAL NIGHTLY
Qty: 30 TABLET | Refills: 3 | Status: SHIPPED | OUTPATIENT
Start: 2025-03-20 | End: 2026-03-20

## 2025-03-20 NOTE — PROGRESS NOTES
Otolaryngology - Head and Neck Surgery Outpatient New Patient Visit Note        Assessment/Plan:   Problem List Items Addressed This Visit    None  Visit Diagnoses         Codes    Chronic rhinitis    -  Primary J31.0    Relevant Medications    triamcinolone (Nasacort) 55 mcg nasal inhaler    Chronic sinusitis, unspecified location     J32.9    Relevant Orders    CT sinus wo IV contrast    Chronic GERD     K21.9    Relevant Medications    omeprazole (PriLOSEC) 20 mg DR capsule    famotidine (Pepcid) 20 mg tablet    Deviated septum     J34.2    Gastroesophageal reflux disease, unspecified whether esophagitis present     K21.9    Dysfunction of both eustachian tubes     H69.93            49yoF with chronic nasal congestion and waxing/waning sinus pressure with rhinitis wihtout obvious sinusitis.  Left lateral nasal sidewall/uncinate area lateralized.   High leftward deviated septum.  Will acquire CT sinus to aid in evaluation of anatomy and rule out ongiong sinusitis.    Will control for rhinitis with nasacort and saline sprays.     ETD in the setting of rhinitis and reflux.      Discussed conservative management of reflux, and risks of long term anti-reflux medications.  Discussed avoidance of triggers, dietary and behavioral management strategies for reflux.  Discussed possible referral to GI for further testing and evaluation.  Will trial maximal medical therapy (combination PPI and H2 blockers) for 1-2 months and assess for symptom response.  Plan for taper of medical management to least possible to control symptoms, in favor of using dietary/behavioral controls.           Follow up:  -plan for follow up in clinic as needed, after completion of ordered studies, and in 1-2 months    All of the above findings, impressions, treatment planning and follow up plans were discussed with the patient who indicated understanding.  the patient was instructed to contact or return to clinic sooner if symptoms/signs persist or  worsen despite the above management.      Sean Osborne MD  Otolaryngology - Head and Neck Surgery            History Of Present Illness  Irena Rivas is a 49 y.o. female presenting for chronic nasal congestion/obstruction and waxing/waning sinus pressure.     Notes recent URI/sinusitis approx 1mo ago, but no ongoing acute sinusitis.  Notes chronic rhinorrhea and PND.   Reports use of flonase, saline and zyrtec with limited effect.     Reports increased runny nose with eating, especially spicy foods    Notes intermittent ear fullness/pressure b/l.     Reports a history of GERD, not treated.                 Past Medical History  She has a past medical history of Other specified health status.    Surgical History  She has no past surgical history on file.     Social History  She reports that she has never smoked. She has never been exposed to tobacco smoke. She has never used smokeless tobacco. No history on file for alcohol use and drug use.    Family History  No family history on file.     Allergies  Patient has no known allergies.    Review of Systems  ROS: Pertinent positives as noted in HPI.    - CONSTITUTIONAL: Does not report weight loss, fever or chills.    - HEENT:   Ear: Does not report tinnitus, vertigo, hearing loss, otalgia, otorrhea  Nose: Does not report  ,  , epistaxis, decreased smell  Throat: Does not report pain, dysphagia, odynophagia  Larynx: Does not report hoarseness,  difficulty breathing, pain with speaking (odynophonia)  Neck: Does not report new masses, pain, swelling  Face: Does not report    ,  , swelling, numbness, weakness     - RESPIRATORY: Does not report SOB or  .    - CV: Does not report palpitations or chest pain.     - GI: Does not report abdominal pain, nausea, vomiting or diarrhea.    - : Does not report dysuria or urinary frequency.    - MSK: Does not report myalgia or joint pain.    - SKIN: Does not report rash or pruritus.    - NEUROLOGICAL: Does not report headache or  syncope.    - PSYCHIATRIC: Does not report recent changes in mood. Does not report anxiety or depression.         Physical Exam:     GENERAL:   Alert & Oriented to person, place and time; Normal affect and appearance. Well developed and well nourished. Conversant & cooperative with examination.     HEAD:   Normocephalic, atraumatic. No sinus tenderness to palpation. Normal parotid bilaterally. Normal facial strength.     NEUROLOGIC:   Cranial nerves II-XII grossly intact, gait WNL. Normal mood and affect.    EYES:   Extraocular movements intact. Pupils equal, round, reactive to light and accommodation. No nystagmus, no ptosis. no scleral injection.    EAR:   Normal auricle. No discomfort or TTP with manipulation.   Handheld otoscopic exam showed normal external auditory canals bilaterally. No purulence or EAC inflammation. Minimal cerumen.   Right tympanic membrane clear and mobile without evidence of perforation, retraction or middle ear effusion.   Left tympanic membrane clear and mobile without evidence of perforation, retraction or middle ear effusion.     NOSE:   No external deformity. No external nasal lesions, lacerations, or scars. Nasal tip symmetrical with normal nasal valves.   Nasal cavity with high leftward septum, edematous  mucosa and turbinates.   Left intranasal sidewall and uncinate lateralized.  No lesions, masses, purulence or polyps.     OC/OP:   Mucous membranes moist, no masses, lesions or exudates.   Normal tongue, floor of mouth, teeth, gums, lips. Normal posterior pharyngeal wall.    Normal tonsils without erythema, exudate or obvious calculi     NECK:   No neck masses or thyroid enlargement. Trachea midline. No tenderness to palpation    LYMPHATIC:   No cervical lymphadenopathy.     RESPIRATORY:   Symmetric chest elevation & no retractions. No significant hoarseness. No increased work of breathing.    CV:   No clubbing or cyanosis. No obvious edema    Skin:   No facial rashes, vesicles or  lesions.     Extremities:   No gross abnormalities      Clinic Procedure    Nasal Endoscopy Laryngoscopy Nasophrayngosocopy   Procedure: flexible fiberoptic laryngoscopy, nasopharyngoscopy.   Flexible Fiberoptic Laryngoscopy Indication:   inability to tolerate mirror exam     Risks, benefits, and alternatives, infection risk, bleeding risk and risk of mucosal trauma and pain were discussed with the patient. Verbal consent was obtained prior to the procedure and is detailed in the patient's record.     Procedure Note:      With the patient seated in the exam chair, the bilateral nasal cavity was topically treated with 4% lidocaine and phenylephrine.  The bilateral nasal cavity was intubated with the flexible laryngoscope.   Nasal Endoscopy: Nasal endoscopy was successfully completed using the endoscope and the nasal mucosa, septum, turbinates, and osteomeatal complex were examined.  Nasopharyngoscopy: Nasopharyngoscopy was successfully completed using the endoscope and the nasal mucosa, septum, turbinates, osteomeatal complex, and nasopharynx were examined.   Laryngoscopy: Laryngoscopy was successfully completed using the flexible laryngoscope and the nasopharynx, hypopharynx, and larynx were examined.  Patient Status: the patient tolerated the procedure well.   Complications: there were no complications.      . After obtaining adequate decongestion and anesthesia, the scope was introduced into the floor of the nasal cavity. The septum, inferior, and middle turbinates were without any mucosal lesions.  The Fossa of [place] were clear bilaterally, and good velopharyngeal closure was obtained on phonation.  Base of tongue, tonsillar complex, and posterior pharyngeal wall free of asymmetry or concerning ulcerations or masses.  supraglottic segments with edema, pachydermia and erythema at the esophageal opening and posterior supraglottis.  scattered mucous debris.  No mucosal ulcerations or masses.  True vocal cords  abduct and adduct normally with no mucosal or mass lesions.  No masses visualized in the pyriform recesses.  The scope was removed and patient tolerated the procedure well.         Information review:  External sources (notes, imaging, lab results) listed below personally reviewed to aid in medical decision making process.  -  -  -

## 2025-03-24 RX ORDER — LIDOCAINE HYDROCHLORIDE 10 MG/ML
4 INJECTION, SOLUTION EPIDURAL; INFILTRATION; INTRACAUDAL; PERINEURAL
Status: COMPLETED | OUTPATIENT
Start: 2025-03-19 | End: 2025-03-19

## 2025-03-24 RX ORDER — ROPIVACAINE HYDROCHLORIDE 5 MG/ML
4 INJECTION, SOLUTION EPIDURAL; INFILTRATION; PERINEURAL
Status: COMPLETED | OUTPATIENT
Start: 2025-03-19 | End: 2025-03-19

## 2025-03-24 RX ORDER — TRIAMCINOLONE ACETONIDE 40 MG/ML
80 INJECTION, SUSPENSION INTRA-ARTICULAR; INTRAMUSCULAR
Status: COMPLETED | OUTPATIENT
Start: 2025-03-19 | End: 2025-03-19

## 2025-03-24 NOTE — PROGRESS NOTES
PRIMARY CARE PHYSICIAN: Dina Galeana MD  REFERRING PROVIDER: No referring provider defined for this encounter.     CONSULT ORTHOPAEDIC: Knee Evaluation    ASSESSMENT & PLAN    Impression/Plan: This is a 49-year-old female presenting for evaluation of chronic right anterior knee pain.  At this time, based on clinical history, physical examination and believe she is suffering from patellofemoral osteoarthritis/chondromalacia in the setting of rheumatoid arthritis.  At this time, we discussed moving forward with a nonoperative management protocol which includes a home exercise program, oral anti-inflammatories, Tylenol.  In addition, we will provide her a corticosteroid injection today.  She is understanding of this plan.  She will follow-up on as-needed basis moving forward.    SUBJECTIVE  CHIEF COMPLAINT:   Chief Complaint   Patient presents with    Right Knee - Pain     NPV - No injury pain going on pre COVID, had knee drain in 2019 feels fluid. Feels tight, like a rubber band.         HPI: Irena Rivas is a 49 y.o. patient.  The patient presents for evaluation of chronic right anterior knee pain.  The pain is worse with going up and down stairs or when sitting for prolonged periods of time.  She has no previous history of surgery to the knee.  She has a history of a single corticosteroid injection roughly 1 year ago.  She has not tried any normal exercise program or physical therapy.  She does take over-the-counter anti-inflammatories and Tylenol as needed for pain.  She presents today for further evaluation discussion of management options.  She denies any back pain.  No previous or current history of distal numbness or tingling.  She does have a history of rheumatoid arthritis and is on methotrexate and folic acid.      REVIEW OF SYSTEMS  Constitutional: See HPI for pain assessment, No significant weight loss, recent trauma  Cardiovascular: No chest pain, shortness of breath  Respiratory: No difficulty  breathing, cough  Gastrointestinal: No nausea, vomiting, diarrhea, constipation  Musculoskeletal: Noted in HPI, positive for pain, restricted motion, stiffness  Integumentary: No rashes, easy bruising, redness   Neurological: no numbness or tingling in extremities, no gait disturbances   Psychiatric: No mood changes, memory changes, social issues  Heme/Lymph: no excessive swelling, easy bruising, excessive bleeding  ENT: No hearing changes  Eyes: No vision changes    Past Medical History:   Diagnosis Date    Other specified health status     No pertinent past medical history        No Known Allergies     History reviewed. No pertinent surgical history.     No family history on file.     Social History     Socioeconomic History    Marital status: Single     Spouse name: Not on file    Number of children: Not on file    Years of education: Not on file    Highest education level: Not on file   Occupational History    Not on file   Tobacco Use    Smoking status: Never     Passive exposure: Never    Smokeless tobacco: Never   Substance and Sexual Activity    Alcohol use: Not on file    Drug use: Not on file    Sexual activity: Not on file   Other Topics Concern    Not on file   Social History Narrative    Not on file     Social Drivers of Health     Financial Resource Strain: Not on file   Food Insecurity: Not on file   Transportation Needs: Not on file   Physical Activity: Not on file   Stress: Not on file   Social Connections: Not on file   Intimate Partner Violence: Not on file   Housing Stability: Not on file        CURRENT MEDICATIONS:   Current Outpatient Medications   Medication Sig Dispense Refill    amLODIPine (Norvasc) 5 mg tablet Take 1 tablet (5 mg) by mouth once daily.      famotidine (Pepcid) 20 mg tablet Take 1 tablet (20 mg) by mouth once daily at bedtime. 30 tablet 3    folic acid (Folvite) 1 mg tablet Take 1 tablet (1 mg) by mouth once daily. 30 tablet 2    methotrexate (Trexall) 2.5 mg tablet Take 5  "tablets (12.5 mg total) by mouth 1 (one) time per week.  Follow directions carefully, and ask to explain any part you do not understand. Take exactly as directed. 20 tablet 2    omeprazole (PriLOSEC) 20 mg DR capsule Take 1 capsule (20 mg) by mouth once daily in the morning. Take before meals. Do not crush or chew. 30 capsule 3    triamcinolone (Nasacort) 55 mcg nasal inhaler Administer 2 sprays into each nostril once daily. 16.5 g 3     No current facility-administered medications for this visit.        OBJECTIVE    PHYSICAL EXAM      3/28/2022     1:32 PM 3/30/2022    10:08 AM 4/1/2022     8:15 AM 8/7/2024     3:06 PM 8/26/2024    11:27 AM 10/29/2024     9:24 AM 3/20/2025     1:51 PM   Vitals   Systolic    125 122 134    Diastolic    85 88 74    BP Location    Right arm Right arm     Heart Rate    83 87 78    Temp    36.5 °C (97.7 °F)      Height 1.725 m (5' 7.91\") 1.724 m (5' 7.87\") 1.727 m (5' 8\") 1.727 m (5' 8\") 1.727 m (5' 8\")     Weight (lb) 240.3 240.3 238 240.6 240 234 222   BMI 36.63 kg/m2 36.67 kg/m2 36.19 kg/m2 36.58 kg/m2 36.49 kg/m2 35.58 kg/m2 33.75 kg/m2   BSA (m2) 2.29 m2 2.28 m2 2.28 m2 2.29 m2 2.29 m2 2.26 m2 2.2 m2   Visit Report    Report Report Report Report      There is no height or weight on file to calculate BMI.    GENERAL: A/Ox3, NAD. Appears healthy, well nourished  PSYCHIATRIC: Mood stable, appropriate memory recall  EYES: EOM intact, no scleral icterus  CARDIAC: regular rate  LUNGS: Breathing non-labored  SKIN: no erythema, rashes, or ecchymoses     MUSCULOSKELETAL:  Laterality: right Knee Exam  This is a well-appearing patient in no acute distress.  There is no effusion to the knee.  There is no tenderness to palpation along the medial joint line, no tenderness to palpation along lateral joint line.  Range of motion: 0 to 120 degrees without pain.  There is mild pain crepitus with manipulation of the patella.  Negative Lachman's exam.  The knee is stable to posterior stress.  The knee " is stable to varus and valgus stress at both 0 and 30 degrees knee flexion.  5/5 Strength TA, EHL, GS    NEUROVASCULAR:  - Neurovascular Status: sensation intact to light touch distally, lower extremity motor intact  - Capillary refill brisk at extremities, Bilateral dorsalis pedis pulse 2+    Imaging: Multiple views of the affected right knee(s) demonstrate: There is no evidence of acute fracture or dislocation.  Well-preserved medial lateral compartmental joint spacing.   X-rays were personally reviewed and interpreted by me.  Radiology reports were reviewed by me as well, if readily available at the time.    L Inj/Asp: R knee on 3/19/2025 2:50 PM  Indications: pain  Details: 22 G needle, superolateral approach  Medications: 80 mg triamcinolone acetonide 40 mg/mL; 4 mL lidocaine PF 10 mg/mL (1 %); 4 mL ropivacaine 5 mg/mL (0.5 %)           Norbert Pro MD, MPH  Attending Surgeon  Sports Medicine Orthopaedic Surgery  [facility] [place]

## 2025-04-30 ENCOUNTER — APPOINTMENT (OUTPATIENT)
Dept: RHEUMATOLOGY | Facility: CLINIC | Age: 50
End: 2025-04-30
Payer: COMMERCIAL

## 2025-04-30 VITALS
DIASTOLIC BLOOD PRESSURE: 85 MMHG | SYSTOLIC BLOOD PRESSURE: 132 MMHG | WEIGHT: 222 LBS | BODY MASS INDEX: 33.75 KG/M2 | OXYGEN SATURATION: 97 % | HEART RATE: 84 BPM | TEMPERATURE: 98.2 F

## 2025-04-30 DIAGNOSIS — M05.9 RHEUMATOID ARTHRITIS WITH POSITIVE RHEUMATOID FACTOR, INVOLVING UNSPECIFIED SITE (MULTI): ICD-10-CM

## 2025-04-30 DIAGNOSIS — R76.8 RHEUMATOID FACTOR POSITIVE: Primary | ICD-10-CM

## 2025-04-30 DIAGNOSIS — Z79.899 LONG-TERM USE OF HIGH-RISK MEDICATION: ICD-10-CM

## 2025-04-30 PROCEDURE — 99214 OFFICE O/P EST MOD 30 MIN: CPT | Performed by: INTERNAL MEDICINE

## 2025-04-30 PROCEDURE — 1036F TOBACCO NON-USER: CPT | Performed by: INTERNAL MEDICINE

## 2025-04-30 ASSESSMENT — PAIN SCALES - GENERAL: PAINLEVEL_OUTOF10: 0-NO PAIN

## 2025-04-30 NOTE — PROGRESS NOTES
49 y.o.        female        49 yr old with joint pains that started in 2019.  Has pain and swelling in her hands then. Saw Dr. Barreto and RF was 296 and anti-CCP antibody was negative and  treated with NSAIDS. Saw me in 8/2024. Her RF and anti-CCP body are positive in 2024.   She reports pain in both shoulders, both feet and hands . Has difficulty putting her bra on. She is taking 16 mg of Ibuprofen.   She has morning stiffness for 10 minutes.   Non-smoker. No family history of RA.   methotrexate started 9/2024    Chief complaint: Follow up of RA    HPI: At today's visit, the patient is feeling fine. Has no morning stiffness or painful or swollen joints. Energy is good.     ROS  Constitutional: Denies fatigue  Head: Denies headaches or hair loss  Eyes: Denies dry eyes, blurry vision, redness of the eyes, pain in the eyes or H/O uveitis.  ENT: Denies dry mouth, loss of taste, sores in the mouth  Cardiovascular: Denies chest pain, palpitations  Respiratory: Denies shortness of breath or cough or wheezing  Gastrointestinal: Denies nausea, vomiting, heartburn, abdominal pain , diarrhea or blood in the stool.   Integumentary: Denies photosensitivity, rash or lesions, Raynaud's or psoriasis  Neurological: Denies any numbness or tingling or weakness  Hematologic/Lymphatic: Denies bleeding, bruising, Raynaud's phenomenon  MSK: As per HPI. Has no back pain      Past Medical History:   Diagnosis Date    Other specified health status     No pertinent past medical history            No Known Allergies    Medication Documentation Review Audit       Reviewed by Roel Jin MA (Medical Assistant) on 04/30/25 at 1156      Medication Order Taking? Sig Documenting Provider Last Dose Status   amLODIPine (Norvasc) 5 mg tablet [id number] No Take 1 tablet (5 mg) by mouth once daily. Historical Provider, MD Taking Active   famotidine (Pepcid) 20 mg tablet [id number]  Take 1 tablet (20 mg) by mouth once daily at bedtime.  Sean Osborne MD  Active   folic acid (Folvite) 1 mg tablet [id number]  Take 1 tablet (1 mg) by mouth once daily. Tatiana Pelayo MD  Active   methotrexate (Trexall) 2.5 mg tablet [id number]  Take 5 tablets (12.5 mg total) by mouth 1 (one) time per week.  Follow directions carefully, and ask to explain any part you do not understand. Take exactly as directed. Tatiana Pelayo MD  Active   omeprazole (PriLOSEC) 20 mg DR capsule [id number]  Take 1 capsule (20 mg) by mouth once daily in the morning. Take before meals. Do not crush or chew. Sean Osborne MD  Active   triamcinolone (Nasacort) 55 mcg nasal inhaler [id number]  Administer 2 sprays into each nostril once daily. Sean Osborne MD  Active                        No family history on file.       Social History     Tobacco Use    Smoking status: Never     Passive exposure: Never    Smokeless tobacco: Never           PHYSICAL EXAM:  Visit Vitals  /85 (BP Location: Left arm, Patient Position: Sitting, BP Cuff Size: Adult)   Pulse 84   Temp 36.8 °C (98.2 °F) (Temporal)     General - NAD, sitting up in chair, well-groomed, pleasant, AAOx3  Head: Normocephalic, atraumatic  Eyes - PERRLA, EOMI. No conjunctiva injection.   Mouth/ENT - Moist oral and nasal mucosa. No facial rash. No enlarged parotid or submandibular gland. Adequate salivary pooling.  Extremities - No edema, cyanosis ,or clubbing  Neurological - Alert and oriented x 3,  grossly intact. No focal deficit.  Musculoskeletal -  EXAMJOINTDETAILED,  Shoulders: Full ROM, without pain, no swelling, warmth or tenderness.  Elbows: Full ROM, without pain, no swelling, warmth or tenderness.  Wrists: Full ROM, without pain, no swelling, warmth or tenderness.  MCP: No swelling, warmth or tenderness. Metacarpal squeeze negative  PIP: No swelling, warmth or tenderness.  DIP: No swelling, warmth or tenderness.  Hands : 5/5.             Lab Results   Component Value Date    CRP 31.3 (H) 02/20/2025      Lab Results  "  Component Value Date    NILAM Positive (A) 08/07/2024     (H) 08/07/2024      No results found for: \"CKTOTAL\"  Lab Results   Component Value Date    NEUTROABS 5.54 08/07/2024      No results found for: \"FERRITIN\"   No results found for: \"HEPATOT\", \"HEPAIGM\", \"HEPBCIGM\", \"HEPBCAB\", \"HBEAG\", \"HEPCAB\"   Lab Results   Component Value Date    ALT 12 02/20/2025    AST 13 02/20/2025    ALKPHOS 49 02/20/2025    BILITOT 0.4 02/20/2025      No results found for: \"PPD\"   No results found for: \"URICACID\"   Lab Results   Component Value Date    CALCIUM 10.0 02/20/2025      No results found for: \"SPEP\", \"UPEP\"   No results found for: \"ALBUR\", \"[id number]\"     No echocardiogram results found for the past 12 months  No DXA results found for the past 12 months     XR knee right 4+ views  Narrative: Interpreted By:  Ray Montgomery,   STUDY:  XR KNEE RIGHT 4+ VIEWS;  3/19/2025 3:13 pm      INDICATION:  Signs/Symptoms:pain.      ,M25.561 Pain in right knee      COMPARISON:  None.      ACCESSION NUMBER(S):  [id number]      ORDERING CLINICIAN:  LIANE LINARES      FINDINGS:  No acute fracture is identified. No dislocation is seen. There are no  lytic or blastic lesions. No radiopaque foreign bodies are noted.  There is a fat fluid level in the suprapatellar bursa, indicating an  occult underlying intra-articular fracture. CT or MRI recommended for  further evaluation.      Impression: Joint effusion with a fat fluid level which indicates an underlying  occult fracture. CT or MRI recommended for further evaluation.      MACRO:  Critical Finding:  See findings. Notification was initiated on  3/20/2025 at 6:24 pm by  Ray Montgomery.  (**-YCF-**)      Signed by: Ray Montgomery 3/20/2025 6:24 PM  Dictation workstation:   [id number]     === 01/06/20 ===    - Impression -  Unremarkable radiographs of the left shoulder      Component      Latest Ref Rng 8/7/2024   WBC      4.4 - 11.3 x10*3/uL 7.7    nRBC      0.0 - 0.0 /100 WBCs 0.0    RBC      " 4.00 - 5.20 x10*6/uL 4.59    HEMOGLOBIN      12.0 - 16.0 g/dL 12.7    HEMATOCRIT      36.0 - 46.0 % 38.2    MCV      80 - 100 fL 83    MCH      26.0 - 34.0 pg 27.7    MCHC      32.0 - 36.0 g/dL 33.2    RED CELL DISTRIBUTION WIDTH      11.5 - 14.5 % 12.7    Platelets      150 - 450 x10*3/uL 315    Neutrophils %      40.0 - 80.0 % 71.8    Immature Granulocytes %, Automated      0.0 - 0.9 % 0.3    Lymphocytes %      13.0 - 44.0 % 22.0    Monocytes %      2.0 - 10.0 % 4.4    Eosinophils %      0.0 - 6.0 % 1.0    Basophils %      0.0 - 2.0 % 0.5    Neutrophils Absolute      1.20 - 7.70 x10*3/uL 5.54    Immature Granulocytes Absolute, Automated      0.00 - 0.70 x10*3/uL 0.02    Lymphocytes Absolute      1.20 - 4.80 x10*3/uL 1.70    Monocytes Absolute      0.10 - 1.00 x10*3/uL 0.34    Eosinophils Absolute      0.00 - 0.70 x10*3/uL 0.08    Basophils Absolute      0.00 - 0.10 x10*3/uL 0.04    GLUCOSE      74 - 99 mg/dL 89    SODIUM      136 - 145 mmol/L 134 (L)    POTASSIUM      3.5 - 5.3 mmol/L 3.9    CHLORIDE      98 - 107 mmol/L 100    Bicarbonate      21 - 32 mmol/L 26    Anion Gap      10 - 20 mmol/L 12    Blood Urea Nitrogen      6 - 23 mg/dL 16    Creatinine      0.50 - 1.05 mg/dL 0.82    EGFR      >60 mL/min/1.73m*2 87    Calcium      8.6 - 10.6 mg/dL 9.7    Albumin      3.4 - 5.0 g/dL 4.2    Alkaline Phosphatase      33 - 110 U/L 44    Total Protein      6.4 - 8.2 g/dL 7.2    AST      9 - 39 U/L 14    Bilirubin Total      0.0 - 1.2 mg/dL 0.3    ALT      7 - 45 U/L 14    Anti-SM      <1.0 AI <0.2    Anti-RNP      <1.0 AI 0.3    Anti-SM/RNP      <1.0 AI <0.2    Anti-SSA      <1.0 AI 0.4    Anti-SSB      <1.0 AI <0.2    Anti-SCL-70      <1.0 AI <0.2    Anti-LISSETH-1 IgG      <1.0 AI <0.2    Anti-Chromatin      <1.0 AI <0.2    Anti-Centromere      <1.0 AI <0.2    ANTI-RIBOSOMAL P      <1.0 AI <0.2    Anti-DNA (DS)      <5.0 IU/mL 1.0    NILAM      Negative  Positive !    NILAM Pattern Homogeneous    NILAM Titer 1:160     Rheumatoid Factor      0 - 15 IU/mL 181 (H)    Citrulline Antibody, IgG      <3 U/mL 12 (H)    C3 Complement      87 - 200 mg/dL 138    C4 Complement      10 - 50 mg/dL 47         Narrative & Impression   Interpreted By:  Emir Robles,   STUDY:  XR FOOT LEFT 1-2 VIEWS; XR FOOT RIGHT 1-2 VIEWS      INDICATION:  Signs/Symptoms:bilateral foot pain.      COMPARISON:  None      ACCESSION NUMBER(S):  [id number]; [id number]      ORDERING CLINICIAN:  DOROTHY ROBERSON      FINDINGS:  Small right foot bunion. Left foot unremarkable. No other osseous,  articular, or soft tissue abnormality.      IMPRESSION:  Normal radiographs left foot.      Small bunion right foot.        Component      Latest Ref Rng 2/20/2025   GLUCOSE      65 - 99 mg/dL 84    UREA NITROGEN (BUN)      7 - 25 mg/dL 16    CREATININE      0.50 - 1.03 mg/dL 1.16 (H)    EGFR      > OR = 60 mL/min/1.73m2 57 (L)    BUN/CREATININE RATIO      6 - 22 (calc) 14    SODIUM      135 - 146 mmol/L 138    POTASSIUM      3.5 - 5.3 mmol/L 3.9    CHLORIDE      98 - 110 mmol/L 101    CARBON DIOXIDE      20 - 32 mmol/L 26    ELECTROLYTE BALANCE      7 - 17 mmol/L (calc) 11    CALCIUM      8.6 - 10.4 mg/dL 10.0    PROTEIN, TOTAL      6.1 - 8.1 g/dL 7.6    ALBUMIN      3.6 - 5.1 g/dL 4.5    GLOBULIN      1.9 - 3.7 g/dL (calc) 3.1    ALBUMIN/GLOBULIN RATIO      1.0 - 2.5 (calc) 1.5    BILIRUBIN, TOTAL      0.2 - 1.2 mg/dL 0.4    BILIRUBIN, DIRECT      < OR = 0.2 mg/dL 0.1    BILIRUBIN, INDIRECT      0.2 - 1.2 mg/dL (calc) 0.3    ALKALINE PHOSPHATASE      37 - 153 U/L 49    AST      10 - 35 U/L 13    ALT      6 - 29 U/L 12    (Always Message) --    WHITE BLOOD CELL COUNT      3.8 - 10.8 Thousand/uL 9.7    RED BLOOD CELL COUNT      3.80 - 5.10 Million/uL 4.53    HEMOGLOBIN      11.7 - 15.5 g/dL 13.2    HEMATOCRIT      35.0 - 45.0 % 40.6    MCV      80.0 - 100.0 fL 89.6    MCH      27.0 - 33.0 pg 29.1    MCHC      32.0 - 36.0 g/dL 32.5    RDW      11.0 - 15.0 % 12.8    PLATELET  COUNT      140 - 400 Thousand/uL 310    MPV      7.5 - 12.5 fL 10.2    C-REACTIVE PROTEIN      <8.0 mg/L 31.3 (H)           Assessment/plan: 49 yr old BF with RA with RF and anti-CCP antibody. She is in remission.   Continue methotrexate 5 pills/week. Labs reviewed.   Reviewed and approved by DOROTHY ROBERSON on 4/30/25 at 12:04 PM.

## 2025-05-14 DIAGNOSIS — M05.79 RHEUMATOID ARTHRITIS INVOLVING MULTIPLE SITES WITH POSITIVE RHEUMATOID FACTOR (MULTI): ICD-10-CM

## 2025-05-14 RX ORDER — METHOTREXATE 2.5 MG/1
12.5 TABLET ORAL
Qty: 20 TABLET | Refills: 2 | Status: SHIPPED | OUTPATIENT
Start: 2025-05-18

## 2025-05-22 ENCOUNTER — APPOINTMENT (OUTPATIENT)
Dept: OTOLARYNGOLOGY | Facility: CLINIC | Age: 50
End: 2025-05-22
Payer: COMMERCIAL

## 2025-07-31 ENCOUNTER — APPOINTMENT (OUTPATIENT)
Dept: INTEGRATIVE MEDICINE | Facility: CLINIC | Age: 50
End: 2025-07-31
Payer: COMMERCIAL

## 2025-08-15 DIAGNOSIS — M05.9 RHEUMATOID ARTHRITIS WITH POSITIVE RHEUMATOID FACTOR, INVOLVING UNSPECIFIED SITE (MULTI): ICD-10-CM

## 2025-08-15 DIAGNOSIS — Z79.899 LONG-TERM USE OF HIGH-RISK MEDICATION: ICD-10-CM

## 2025-08-20 DIAGNOSIS — M05.79 RHEUMATOID ARTHRITIS INVOLVING MULTIPLE SITES WITH POSITIVE RHEUMATOID FACTOR (MULTI): ICD-10-CM

## 2025-08-22 ENCOUNTER — TELEPHONE (OUTPATIENT)
Dept: RHEUMATOLOGY | Facility: CLINIC | Age: 50
End: 2025-08-22
Payer: COMMERCIAL

## 2025-08-22 LAB
ALBUMIN SERPL-MCNC: 4.5 G/DL (ref 3.6–5.1)
ALBUMIN/GLOB SERPL: 1.7 (CALC) (ref 1–2.5)
ALP SERPL-CCNC: 47 U/L (ref 37–153)
ALT SERPL-CCNC: 25 U/L (ref 6–29)
AST SERPL-CCNC: 20 U/L (ref 10–35)
BASOPHILS # BLD AUTO: 33 CELLS/UL (ref 0–200)
BASOPHILS NFR BLD AUTO: 0.5 %
BILIRUB SERPL-MCNC: 0.3 MG/DL (ref 0.2–1.2)
BUN SERPL-MCNC: 23 MG/DL (ref 7–25)
BUN/CREAT SERPL: NORMAL (CALC) (ref 6–22)
CALCIUM SERPL-MCNC: 10 MG/DL (ref 8.6–10.4)
CHLORIDE SERPL-SCNC: 105 MMOL/L (ref 98–110)
CO2 SERPL-SCNC: 23 MMOL/L (ref 20–32)
CREAT SERPL-MCNC: 1.03 MG/DL (ref 0.5–1.03)
CRP SERPL-MCNC: <3 MG/L
EGFRCR SERPLBLD CKD-EPI 2021: 65 ML/MIN/1.73M2
EOSINOPHIL # BLD AUTO: 143 CELLS/UL (ref 15–500)
EOSINOPHIL NFR BLD AUTO: 2.2 %
ERYTHROCYTE [DISTWIDTH] IN BLOOD BY AUTOMATED COUNT: 13 % (ref 11–15)
GLOBULIN SER CALC-MCNC: 2.7 G/DL (CALC) (ref 1.9–3.7)
GLUCOSE SERPL-MCNC: 88 MG/DL (ref 65–99)
HCT VFR BLD AUTO: 39.1 % (ref 35–45)
HGB BLD-MCNC: 12.4 G/DL (ref 11.7–15.5)
LYMPHOCYTES # BLD AUTO: 1781 CELLS/UL (ref 850–3900)
LYMPHOCYTES NFR BLD AUTO: 27.4 %
MCH RBC QN AUTO: 28 PG (ref 27–33)
MCHC RBC AUTO-ENTMCNC: 31.7 G/DL (ref 32–36)
MCV RBC AUTO: 88.3 FL (ref 80–100)
MONOCYTES # BLD AUTO: 332 CELLS/UL (ref 200–950)
MONOCYTES NFR BLD AUTO: 5.1 %
NEUTROPHILS # BLD AUTO: 4212 CELLS/UL (ref 1500–7800)
NEUTROPHILS NFR BLD AUTO: 64.8 %
PLATELET # BLD AUTO: 264 THOUSAND/UL (ref 140–400)
PMV BLD REES-ECKER: 9.8 FL (ref 7.5–12.5)
POTASSIUM SERPL-SCNC: 4.9 MMOL/L (ref 3.5–5.3)
PROT SERPL-MCNC: 7.2 G/DL (ref 6.1–8.1)
RBC # BLD AUTO: 4.43 MILLION/UL (ref 3.8–5.1)
SODIUM SERPL-SCNC: 141 MMOL/L (ref 135–146)
WBC # BLD AUTO: 6.5 THOUSAND/UL (ref 3.8–10.8)

## 2025-08-25 RX ORDER — METHOTREXATE 2.5 MG/1
12.5 TABLET ORAL
Qty: 20 TABLET | Refills: 2 | Status: SHIPPED | OUTPATIENT
Start: 2025-08-31

## 2025-11-19 ENCOUNTER — APPOINTMENT (OUTPATIENT)
Dept: RHEUMATOLOGY | Facility: CLINIC | Age: 50
End: 2025-11-19
Payer: COMMERCIAL